# Patient Record
Sex: MALE | Race: ASIAN | NOT HISPANIC OR LATINO | URBAN - METROPOLITAN AREA
[De-identification: names, ages, dates, MRNs, and addresses within clinical notes are randomized per-mention and may not be internally consistent; named-entity substitution may affect disease eponyms.]

---

## 2018-07-27 ENCOUNTER — AMBULATORY - HEALTHEAST (OUTPATIENT)
Dept: ENDOCRINOLOGY | Facility: CLINIC | Age: 63
End: 2018-07-27

## 2018-07-27 DIAGNOSIS — E11.9 DIABETES MELLITUS, TYPE 2 (H): ICD-10-CM

## 2018-08-15 ENCOUNTER — COMMUNICATION - HEALTHEAST (OUTPATIENT)
Dept: ENDOCRINOLOGY | Facility: CLINIC | Age: 63
End: 2018-08-15

## 2018-08-27 ENCOUNTER — AMBULATORY - HEALTHEAST (OUTPATIENT)
Dept: LAB | Facility: CLINIC | Age: 63
End: 2018-08-27

## 2018-08-27 DIAGNOSIS — E11.9 DIABETES MELLITUS, TYPE 2 (H): ICD-10-CM

## 2018-08-27 LAB
ANION GAP SERPL CALCULATED.3IONS-SCNC: 7 MMOL/L (ref 5–18)
BUN SERPL-MCNC: 20 MG/DL (ref 8–22)
CALCIUM SERPL-MCNC: 10 MG/DL (ref 8.5–10.5)
CHLORIDE BLD-SCNC: 109 MMOL/L (ref 98–107)
CO2 SERPL-SCNC: 26 MMOL/L (ref 22–31)
CREAT SERPL-MCNC: 0.92 MG/DL (ref 0.7–1.3)
CREAT UR-MCNC: 94.2 MG/DL
GFR SERPL CREATININE-BSD FRML MDRD: >60 ML/MIN/1.73M2
GLUCOSE BLD-MCNC: 60 MG/DL (ref 70–125)
HBA1C MFR BLD: 9 % (ref 3.5–6)
LDLC SERPL CALC-MCNC: 142 MG/DL
MICROALBUMIN UR-MCNC: <0.5 MG/DL (ref 0–1.99)
MICROALBUMIN/CREAT UR: NORMAL MG/G
POTASSIUM BLD-SCNC: 4 MMOL/L (ref 3.5–5)
SODIUM SERPL-SCNC: 142 MMOL/L (ref 136–145)

## 2018-08-28 ENCOUNTER — RECORDS - HEALTHEAST (OUTPATIENT)
Dept: ADMINISTRATIVE | Facility: OTHER | Age: 63
End: 2018-08-28

## 2018-08-28 ENCOUNTER — OFFICE VISIT - HEALTHEAST (OUTPATIENT)
Dept: ENDOCRINOLOGY | Facility: CLINIC | Age: 63
End: 2018-08-28

## 2018-08-28 ENCOUNTER — AMBULATORY - HEALTHEAST (OUTPATIENT)
Dept: ENDOCRINOLOGY | Facility: CLINIC | Age: 63
End: 2018-08-28

## 2018-08-28 DIAGNOSIS — E11.9 DIABETES MELLITUS, TYPE 2 (H): ICD-10-CM

## 2018-11-28 ENCOUNTER — AMBULATORY - HEALTHEAST (OUTPATIENT)
Dept: LAB | Facility: CLINIC | Age: 63
End: 2018-11-28

## 2018-11-28 DIAGNOSIS — E11.9 DIABETES MELLITUS, TYPE 2 (H): ICD-10-CM

## 2018-11-28 LAB — HBA1C MFR BLD: 7.9 % (ref 3.5–6)

## 2018-11-29 ENCOUNTER — COMMUNICATION - HEALTHEAST (OUTPATIENT)
Dept: ADMINISTRATIVE | Facility: CLINIC | Age: 63
End: 2018-11-29

## 2018-12-04 ENCOUNTER — OFFICE VISIT - HEALTHEAST (OUTPATIENT)
Dept: ENDOCRINOLOGY | Facility: CLINIC | Age: 63
End: 2018-12-04

## 2018-12-04 DIAGNOSIS — Z79.4 TYPE 2 DIABETES MELLITUS WITHOUT COMPLICATION, WITH LONG-TERM CURRENT USE OF INSULIN (H): ICD-10-CM

## 2018-12-04 DIAGNOSIS — E11.9 TYPE 2 DIABETES MELLITUS WITHOUT COMPLICATION, WITH LONG-TERM CURRENT USE OF INSULIN (H): ICD-10-CM

## 2018-12-04 ASSESSMENT — MIFFLIN-ST. JEOR: SCORE: 1453.97

## 2018-12-25 ENCOUNTER — COMMUNICATION - HEALTHEAST (OUTPATIENT)
Dept: TELEHEALTH | Facility: CLINIC | Age: 63
End: 2018-12-25

## 2019-03-20 ENCOUNTER — AMBULATORY - HEALTHEAST (OUTPATIENT)
Dept: ENDOCRINOLOGY | Facility: CLINIC | Age: 64
End: 2019-03-20

## 2019-03-20 DIAGNOSIS — E11.9 TYPE 2 DIABETES MELLITUS WITHOUT COMPLICATION, WITHOUT LONG-TERM CURRENT USE OF INSULIN (H): ICD-10-CM

## 2019-06-04 ENCOUNTER — COMMUNICATION - HEALTHEAST (OUTPATIENT)
Dept: SCHEDULING | Facility: CLINIC | Age: 64
End: 2019-06-04

## 2019-06-06 ENCOUNTER — AMBULATORY - HEALTHEAST (OUTPATIENT)
Dept: LAB | Facility: CLINIC | Age: 64
End: 2019-06-06

## 2019-06-06 DIAGNOSIS — E11.9 TYPE 2 DIABETES MELLITUS WITHOUT COMPLICATION, WITHOUT LONG-TERM CURRENT USE OF INSULIN (H): ICD-10-CM

## 2019-06-06 LAB
ANION GAP SERPL CALCULATED.3IONS-SCNC: 6 MMOL/L (ref 5–18)
BUN SERPL-MCNC: 16 MG/DL (ref 8–22)
CALCIUM SERPL-MCNC: 9.9 MG/DL (ref 8.5–10.5)
CHLORIDE BLD-SCNC: 106 MMOL/L (ref 98–107)
CO2 SERPL-SCNC: 27 MMOL/L (ref 22–31)
CREAT SERPL-MCNC: 1.06 MG/DL (ref 0.7–1.3)
GFR SERPL CREATININE-BSD FRML MDRD: >60 ML/MIN/1.73M2
GLUCOSE BLD-MCNC: 146 MG/DL (ref 70–125)
HBA1C MFR BLD: 7.9 % (ref 3.5–6)
POTASSIUM BLD-SCNC: 4.4 MMOL/L (ref 3.5–5)
SODIUM SERPL-SCNC: 139 MMOL/L (ref 136–145)

## 2019-06-11 ENCOUNTER — OFFICE VISIT - HEALTHEAST (OUTPATIENT)
Dept: ENDOCRINOLOGY | Facility: CLINIC | Age: 64
End: 2019-06-11

## 2019-06-11 ENCOUNTER — AMBULATORY - HEALTHEAST (OUTPATIENT)
Dept: ENDOCRINOLOGY | Facility: CLINIC | Age: 64
End: 2019-06-11

## 2019-06-11 DIAGNOSIS — Z79.4 TYPE 2 DIABETES MELLITUS WITHOUT COMPLICATION, WITH LONG-TERM CURRENT USE OF INSULIN (H): ICD-10-CM

## 2019-06-11 DIAGNOSIS — E11.9 TYPE 2 DIABETES MELLITUS WITHOUT COMPLICATION, WITH LONG-TERM CURRENT USE OF INSULIN (H): ICD-10-CM

## 2019-06-11 RX ORDER — FLASH GLUCOSE SENSOR
1 KIT MISCELLANEOUS DAILY
Qty: 2 KIT | Refills: 5 | Status: SHIPPED | OUTPATIENT
Start: 2019-06-11

## 2019-06-11 RX ORDER — FLASH GLUCOSE SENSOR
1 KIT MISCELLANEOUS DAILY
Qty: 1 EACH | Refills: 1 | Status: SHIPPED | OUTPATIENT
Start: 2019-06-11

## 2019-06-11 ASSESSMENT — MIFFLIN-ST. JEOR: SCORE: 1450.34

## 2019-06-13 ENCOUNTER — COMMUNICATION - HEALTHEAST (OUTPATIENT)
Dept: ADMINISTRATIVE | Facility: CLINIC | Age: 64
End: 2019-06-13

## 2019-06-13 ENCOUNTER — COMMUNICATION - HEALTHEAST (OUTPATIENT)
Dept: ENDOCRINOLOGY | Facility: CLINIC | Age: 64
End: 2019-06-13

## 2019-07-24 ENCOUNTER — COMMUNICATION - HEALTHEAST (OUTPATIENT)
Dept: ADMINISTRATIVE | Facility: CLINIC | Age: 64
End: 2019-07-24

## 2019-07-24 DIAGNOSIS — E11.9 DIABETES MELLITUS WITHOUT COMPLICATION (H): ICD-10-CM

## 2019-08-12 ENCOUNTER — COMMUNICATION - HEALTHEAST (OUTPATIENT)
Dept: ENDOCRINOLOGY | Facility: CLINIC | Age: 64
End: 2019-08-12

## 2019-08-12 DIAGNOSIS — E11.9 DIABETES MELLITUS, TYPE 2 (H): ICD-10-CM

## 2019-08-12 RX ORDER — ROSUVASTATIN CALCIUM 40 MG/1
TABLET, COATED ORAL
Qty: 90 TABLET | Refills: 1 | Status: SHIPPED | OUTPATIENT
Start: 2019-08-12

## 2019-08-27 ENCOUNTER — COMMUNICATION - HEALTHEAST (OUTPATIENT)
Dept: ENDOCRINOLOGY | Facility: CLINIC | Age: 64
End: 2019-08-27

## 2019-08-27 DIAGNOSIS — E11.9 DIABETES MELLITUS, TYPE 2 (H): ICD-10-CM

## 2019-08-28 RX ORDER — GLIPIZIDE 10 MG/1
TABLET ORAL
Qty: 180 TABLET | Refills: 1 | Status: SHIPPED | OUTPATIENT
Start: 2019-08-28

## 2019-10-03 ENCOUNTER — COMMUNICATION - HEALTHEAST (OUTPATIENT)
Dept: ENDOCRINOLOGY | Facility: CLINIC | Age: 64
End: 2019-10-03

## 2019-10-03 DIAGNOSIS — E11.9 DIABETES MELLITUS, TYPE 2 (H): ICD-10-CM

## 2019-10-04 RX ORDER — INSULIN HUMAN 100 [IU]/ML
INJECTION, SUSPENSION SUBCUTANEOUS
Qty: 18 ML | Refills: 0 | Status: SHIPPED | OUTPATIENT
Start: 2019-10-04

## 2020-04-04 ENCOUNTER — COMMUNICATION - HEALTHEAST (OUTPATIENT)
Dept: ENDOCRINOLOGY | Facility: CLINIC | Age: 65
End: 2020-04-04

## 2020-04-04 DIAGNOSIS — E11.9 DIABETES MELLITUS, TYPE 2 (H): ICD-10-CM

## 2021-05-29 NOTE — TELEPHONE ENCOUNTER
Patient requested call back .  Who is calling:  Patient   Reason for Call:  Patient states that he lost his Lavonne reader . Patient requested to give a call .  Date of last appointment with primary care: 6/11/19  Okay to leave a detailed message: No

## 2021-05-29 NOTE — TELEPHONE ENCOUNTER
Call from pt       Just wanted to notify provider that he needed to re-schedule his lab test to another day         A/P:   > Will let them know       Charlie Renteria RN   Triage and Medication Refills

## 2021-05-29 NOTE — TELEPHONE ENCOUNTER
[facility] Team    In regards of: flash glucose scanning reader (Scrybe JEREMI 14 DAY READER) Misc    [facility]: [id number] [place], [place] [facility] [address]    Patient states that he went walking and he lost it. Will need another rx sent to the pharmacy above to get one. Notified that we can do so, but unsure if his insurance will pay for it. He states he understands and that if they dont he will go back to the old way of checking his sugars.

## 2021-05-29 NOTE — PROGRESS NOTES
"[facility]  ENDOCRINOLOGY    Diabetes Note 6/11/2019    Capo Torres, 1955, [id number]          Reason for visit      1. Type 2 diabetes mellitus without complication, with long-term current use of insulin (H)        HPI     Capo Torres is a very pleasant 63 y.o. old male who presents for follow up.  SUMMARY:  Capo returns today in f/u for DM 2. His current A1c is 7.9 and the same as his last.  He continues using 70/30 insulin.  He is taking 10 units in the morning and 10 units in the evening. He is also taking Glipizide and Metformin.   He notes that when he eats noodles, his BG go up. He brings both of his glucometers in today.  BG run between 51 and 334.  He is unsure why he had a 51.  He does note that his numbers have been better since Tax Season is over with. He reports that he is eating less rice and fewer noodles these days.       Past Medical History     Patient Active Problem List   Diagnosis     Vitamin D Deficiency     Diabetes mellitus without complication (H)     Mixed hyperlipidemia     Health maintenance examination     Insomnia, idiopathic     Nocturia        Family History       family history is not on file.    Social History      reports that he has never smoked. He has never used smokeless tobacco.      Review of Systems     Patient has no polyuria or polydipsia, no chest pain, dyspnea or TIA's, no numbness, tingling or pain in extremities  Remainder negative except as noted in HPI.    Vital Signs     BP (!) 82/60 (Patient Site: Right Arm, Patient Position: Sitting, Cuff Size: Adult Regular)   Pulse 64   Ht 5' 8\" (1.727 m)   Wt 151 lb 3.2 oz (68.6 kg)   BMI 22.99 kg/m    Wt Readings from Last 3 Encounters:   06/11/19 151 lb 3.2 oz (68.6 kg)   12/04/18 152 lb (68.9 kg)   08/28/18 148 lb 14.4 oz (67.5 kg)       Physical Exam     Constitutional:  Well developed, Well nourished  HENT:  Normocephalic,   Neck: Thyroid normal, No lymph nodes, Supple  Eyes:  PERRL, Conjunctiva pink  Respiratory:  " Normal breath sounds, No respiratory distress  Cardiovascular:  Normal heart rate, Normal rhythm, No murmurs  GI:  Bowel sounds normal, Soft, No tenderness  Musculoskeletal:  No gross deformity or lesions, normal dorsalis pedis pulses  Skin: No acanthosis nigricans, lipoatrophy or lipodystrophy  Neurologic:  Alert & oriented x 3, nonfocal  Psychiatric:  Affect, Mood, Insight appropriate  Diabetic foot exam: no ulcers, charcot's or high risk calluses, Normal monofilament exam        Assessment     1. Type 2 diabetes mellitus without complication, with long-term current use of insulin (H)        Plan     He will remain on his current medication regimen. Encouraged to eat high protein snack at HS instead of ice cream. He will work on this. F/u with me in 6 months. Time spent with pt today: 25 min with >50% spent in counseling and coordination of care.        Elin MATTA Endocrinology  6/11/2019  10:36 AM          Lab Results     Hemoglobin A1c   Date Value Ref Range Status   06/06/2019 7.9 (H) 3.5 - 6.0 % Final   11/28/2018 7.9 (H) 3.5 - 6.0 % Final   08/27/2018 9.0 (H) 3.5 - 6.0 % Final   04/23/2014 7.5 (H) 3.5 - 6.0 % Final   06/20/2013 8.5 (H) 4.5 - 6.2 % Final     Comment:     New test method with new reference range as of 6/4/12     Creatinine   Date Value Ref Range Status   06/06/2019 1.06 0.70 - 1.30 mg/dL Final   08/27/2018 0.92 0.70 - 1.30 mg/dL Final   04/23/2014 1.1 0.6 - 1.3 mg/dL Final     Microalbumin, Random Urine   Date Value Ref Range Status   08/27/2018 <0.50 0.00 - 1.99 mg/dL Final       Cholesterol   Date Value Ref Range Status   04/23/2014 135 <200 mg/dL Final     HDL Cholesterol   Date Value Ref Range Status   04/23/2014 48 >39 mg/dL Final     LDL Calculated   Date Value Ref Range Status   04/23/2014 69.8 <130.1 mg/dL Final     Triglycerides   Date Value Ref Range Status   04/23/2014 86 <151 mg/dL Final       Lab Results   Component Value Date    ALT 34 06/20/2013    AST 21 06/20/2013     ALKPHOS 87 06/20/2013    BILITOT 1.03 (H) 06/20/2013         Current Medications     Outpatient Medications Prior to Visit   Medication Sig Dispense Refill     b complex vitamins tablet Take 1 tablet by mouth daily.       aspirin 81 MG EC tablet Take 81 mg by mouth daily.       blood sugar diagnostic (ACCU-CHEK SMARTVIEW TEST STRIP) Strp Use 1 each As Directed 2 (two) times a day.        cholecalciferol, vitamin D3, (VITAMIN D3) 2,000 unit cap Take 2,000 Units by mouth daily.       FOLIC ACID/MULTIVITS-MIN/LUT (CENTRUM SILVER ORAL) Take 1 tablet by mouth daily.       glipiZIDE (GLUCOTROL) 10 MG tablet Take 1 tablet (10 mg total) by mouth 2 (two) times a day before meals. 180 tablet 3     insulin NPH and regular human (HUMULIN 70/30 U-100 KWIKPEN) 100 unit/mL (70-30) pen Take 10 units in the morning and 10 units in the evening 30 mL 3     metFORMIN (GLUCOPHAGE) 1000 MG tablet Take 1,000 mg by mouth 2 (two) times a day with meals.       rosuvastatin (CRESTOR) 40 MG tablet Take 1 tablet (40 mg total) by mouth at bedtime. 90 tablet 3     No facility-administered medications prior to visit.

## 2021-06-01 VITALS — WEIGHT: 148.9 LBS

## 2021-06-02 VITALS — WEIGHT: 151.2 LBS | HEIGHT: 68 IN | BODY MASS INDEX: 22.91 KG/M2

## 2021-06-02 VITALS — HEIGHT: 68 IN | BODY MASS INDEX: 23.04 KG/M2 | WEIGHT: 152 LBS

## 2021-06-16 PROBLEM — F51.01 INSOMNIA, IDIOPATHIC: Status: ACTIVE | Noted: 2017-07-19

## 2021-06-16 PROBLEM — R35.1 NOCTURIA: Status: ACTIVE | Noted: 2017-07-19

## 2021-06-16 PROBLEM — Z00.00 HEALTH MAINTENANCE EXAMINATION: Status: ACTIVE | Noted: 2019-06-11

## 2021-06-20 NOTE — PROGRESS NOTES
[facility]  ENDOCRINOLOGY    Diabetes Note 8/29/2018    Capo Torres, 1955, [id number]          Reason for visit      1. Diabetes mellitus, type 2 (H)        HPI     Capo Torres is a very pleasant 62 y.o. old male who presents for follow up.  SUMMARY:  Capo returns to clinic today in f/u for DM 2.  He was last seen here by Dr Metcalf in 2014.  Since then, he has been seen at [facility] by another Endocrinologist.  His current A1c is 9.  He states that this is higher than usual for him. He says that he was on vacation for a month. Unfortunately, this also included a vacation from good DM care. He states that there was no time to exercise or time even take his insulin as he should have done.  He says that he took his morning dose of 70/30, but not his evening dose.  He takes Glipizide 10 mg twice daily before meals.  He mentions that he has take the Glipizide and the 70/30 together and then had some hypoglycemia.  When asked if he had eaten, he stated that he had not.  He takes 10 units of the Humulin 70/30 two times a day and Metformin 500 mg.       Blood glucose data:  Ave: 224, SD: 84    Past Medical History     Patient Active Problem List   Diagnosis     Vitamin D Deficiency     Diabetes mellitus, type 2 (H)     Hyperlipidemia        Family History       family history is not on file.    Social History            Review of Systems     Patient has no polyuria or polydipsia, no chest pain, dyspnea or TIA's, no numbness, tingling or pain in extremities  Remainder negative except as noted in HPI.    Vital Signs     BP 90/64 (Patient Site: Right Arm, Patient Position: Sitting, Cuff Size: Adult Regular)  Pulse 76  Wt 148 lb 14.4 oz (67.5 kg)  Wt Readings from Last 3 Encounters:   08/28/18 148 lb 14.4 oz (67.5 kg)       Physical Exam     Constitutional:  Well developed, Well nourished  HENT:  Normocephalic,   Neck: Thyroid normal, No lymph nodes, Supple  Eyes:  PERRL, Conjunctiva pink  Respiratory:  Normal breath  sounds, No respiratory distress  Cardiovascular:  Normal heart rate, Normal rhythm, No murmurs  GI:  Bowel sounds normal, Soft, No tenderness  Musculoskeletal:  No gross deformity or lesions, normal dorsalis pedis pulses  Skin: No acanthosis nigricans, lipoatrophy or lipodystrophy  Neurologic:  Alert & oriented x 3, nonfocal  Psychiatric:  Affect, Mood, Insight appropriate  Diabetic foot exam: no ulcers, charcot's or high risk calluses, Normal monofilament exam        Assessment     1. Diabetes mellitus, type 2 (H)        Plan     Capo is going to work on taking ALL of his medication and return to exercising daily, and pretty sure that this will make things better.  He will f/u with me in 3 months. Time spent with pt today: 25 min with >50% spent in counseling and coordination of care.        Elin MATTA Endocrinology  8/29/2018  10:43 AM      Lab Results     Hemoglobin A1c   Date Value Ref Range Status   08/27/2018 9.0 (H) 3.5 - 6.0 % Final   04/23/2014 7.5 (H) 3.5 - 6.0 % Final   06/20/2013 8.5 (H) 4.5 - 6.2 % Final     Comment:     New test method with new reference range as of 6/4/12 01/11/2013 8.4 (H) 4.5 - 6.2 % Final     Comment:     New test method with new reference range as of 6/4/12 03/12/2012 7.8 (H) 4.8 - 6.0 % Final     Creatinine   Date Value Ref Range Status   08/27/2018 0.92 0.70 - 1.30 mg/dL Final   04/23/2014 1.1 0.6 - 1.3 mg/dL Final   06/20/2013 1.1 0.6 - 1.3 mg/dL Final     Microalbumin, Random Urine   Date Value Ref Range Status   08/27/2018 <0.50 0.00 - 1.99 mg/dL Final       Cholesterol   Date Value Ref Range Status   04/23/2014 135 <200 mg/dL Final     HDL Cholesterol   Date Value Ref Range Status   04/23/2014 48 >39 mg/dL Final     LDL Calculated   Date Value Ref Range Status   04/23/2014 69.8 <130.1 mg/dL Final     Triglycerides   Date Value Ref Range Status   04/23/2014 86 <151 mg/dL Final       Lab Results   Component Value Date    ALT 34 06/20/2013    AST 21 06/20/2013     ALKPHOS 87 06/20/2013    BILITOT 1.03 (H) 06/20/2013         Current Medications     Outpatient Medications Prior to Visit   Medication Sig Dispense Refill     aspirin 81 MG EC tablet Take 81 mg by mouth daily.       blood sugar diagnostic (ACCU-CHEK SMARTVIEW TEST STRIP) Strp Use 1 each As Directed 2 (two) times a day.        cholecalciferol, vitamin D3, (VITAMIN D3) 2,000 unit cap Take 2,000 Units by mouth daily.       FOLIC ACID/MULTIVITS-MIN/LUT (CENTRUM SILVER ORAL) Take 1 tablet by mouth daily.       glipiZIDE (GLUCOTROL) 10 MG tablet Take 10 mg by mouth 2 (two) times a day before meals.       metFORMIN (GLUCOPHAGE-XR) 750 MG 24 hr tablet TAKE TWO TABLETS BY MOUTH ONCE DAILY WITH FOOD 60 tablet 0     metFORMIN (GLUCOPHAGE-XR) 750 MG 24 hr tablet Take 2 tablets (1,500 mg total) by mouth daily with breakfast. 60 tablet 3     rosuvastatin (CRESTOR) 40 MG tablet Take 40 mg by mouth bedtime.       No facility-administered medications prior to visit.

## 2021-06-22 NOTE — PROGRESS NOTES
"[facility]  ENDOCRINOLOGY    Diabetes Note 12/6/2018    Capo Torres, 1955, [id number]          Reason for visit      1. Type 2 diabetes mellitus without complication, with long-term current use of insulin (H)        HPI     Capo Torres is a very pleasant 63 y.o. old male who presents for follow up.  SUMMARY:  Capo returns today in f/u for DM 2.  His current A1c is down to 7.9 from 9.  He reports that he has been running and playing [employer] to help with his blood sugars.  It appears to be working.  He states that sometimes he will get low in the middle of the night when he is has been exercising strenuously.  He is taking Humulin 70/30 mix at 10 units in the morning and 8-10 units in the evening.  He also takes Glipizide 10 mg twice daily, as well as 2 g of Metformin daily.  His glucometer download shows that he is testing about once a day.  He did have a low of 65 one morning, and states that he has made some adjustments to how he is eating to prevent this from happening.       Blood glucose data:  Ave: 173, SD: 84    Past Medical History     Patient Active Problem List   Diagnosis     Vitamin D Deficiency     Diabetes mellitus, type 2 (H)     Hyperlipidemia        Family History       family history is not on file.    Social History      reports that  has never smoked. he has never used smokeless tobacco.      Review of Systems     Patient has no polyuria or polydipsia, no chest pain, dyspnea or TIA's, no numbness, tingling or pain in extremities  Remainder negative except as noted in HPI.    Vital Signs     BP 94/72   Ht 5' 8\" (1.727 m)   Wt 152 lb (68.9 kg)   BMI 23.11 kg/m    Wt Readings from Last 3 Encounters:   12/04/18 152 lb (68.9 kg)   08/28/18 148 lb 14.4 oz (67.5 kg)       Physical Exam     Constitutional:  Well developed, Well nourished  HENT:  Normocephalic,   Neck: Thyroid normal, No lymph nodes, Supple  Eyes:  PERRL, Conjunctiva pink  Respiratory:  Normal breath sounds, No respiratory " distress  Cardiovascular:  Normal heart rate, Normal rhythm, No murmurs  GI:  Bowel sounds normal, Soft, No tenderness  Musculoskeletal:  No gross deformity or lesions, normal dorsalis pedis pulses  Skin: No acanthosis nigricans, lipoatrophy or lipodystrophy  Neurologic:  Alert & oriented x 3, nonfocal  Psychiatric:  Affect, Mood, Insight appropriate  Diabetic foot exam: no ulcers, charcot's or high risk calluses, Normal monofilament exam        Assessment     1. Type 2 diabetes mellitus without complication, with long-term current use of insulin (H)        Plan     Capo's DM management has improved.  He will continue with his current medication regimen.  Suggested that he eat a high protein snack before bed to help with overnight hypoglycemia. Refills provided.  He will f/u with me in 6 months. Time spent with pt today: 25 min with >50% spent in counseling and coordination of care.        Elin MATTA Endocrinology  12/6/2018  6:53 AM        Lab Results     Hemoglobin A1c   Date Value Ref Range Status   11/28/2018 7.9 (H) 3.5 - 6.0 % Final   08/27/2018 9.0 (H) 3.5 - 6.0 % Final   04/23/2014 7.5 (H) 3.5 - 6.0 % Final   06/20/2013 8.5 (H) 4.5 - 6.2 % Final     Comment:     New test method with new reference range as of 6/4/12 01/11/2013 8.4 (H) 4.5 - 6.2 % Final     Comment:     New test method with new reference range as of 6/4/12     Creatinine   Date Value Ref Range Status   08/27/2018 0.92 0.70 - 1.30 mg/dL Final   04/23/2014 1.1 0.6 - 1.3 mg/dL Final   06/20/2013 1.1 0.6 - 1.3 mg/dL Final     Microalbumin, Random Urine   Date Value Ref Range Status   08/27/2018 <0.50 0.00 - 1.99 mg/dL Final       Cholesterol   Date Value Ref Range Status   04/23/2014 135 <200 mg/dL Final     HDL Cholesterol   Date Value Ref Range Status   04/23/2014 48 >39 mg/dL Final     LDL Calculated   Date Value Ref Range Status   04/23/2014 69.8 <130.1 mg/dL Final     Triglycerides   Date Value Ref Range Status   04/23/2014 86 <151  mg/dL Final       Lab Results   Component Value Date    ALT 34 06/20/2013    AST 21 06/20/2013    ALKPHOS 87 06/20/2013    BILITOT 1.03 (H) 06/20/2013         Current Medications     Outpatient Medications Prior to Visit   Medication Sig Dispense Refill     aspirin 81 MG EC tablet Take 81 mg by mouth daily.       blood sugar diagnostic (ACCU-CHEK SMARTVIEW TEST STRIP) Strp Use 1 each As Directed 2 (two) times a day.        cholecalciferol, vitamin D3, (VITAMIN D3) 2,000 unit cap Take 2,000 Units by mouth daily.       glipiZIDE (GLUCOTROL) 10 MG tablet Take 1 tablet (10 mg total) by mouth 2 (two) times a day before meals. 180 tablet 3     insulin NPH and regular human (HUMULIN 70/30 U-100 KWIKPEN) 100 unit/mL (70-30) pen Take 10 units in the morning and 10 units in the evening 30 mL 3     metFORMIN (GLUCOPHAGE) 1000 MG tablet Take 1,000 mg by mouth 2 (two) times a day with meals.       rosuvastatin (CRESTOR) 40 MG tablet Take 1 tablet (40 mg total) by mouth at bedtime. 90 tablet 3     metFORMIN (GLUMETZA) 500 MG (MOD) 24 hr tablet Take 1,000 mg by mouth daily with breakfast Take 4 tabs daily .             FOLIC ACID/MULTIVITS-MIN/LUT (CENTRUM SILVER ORAL) Take 1 tablet by mouth daily.       No facility-administered medications prior to visit.

## 2021-08-21 ENCOUNTER — HEALTH MAINTENANCE LETTER (OUTPATIENT)
Age: 66
End: 2021-08-21

## 2021-10-16 ENCOUNTER — HEALTH MAINTENANCE LETTER (OUTPATIENT)
Age: 66
End: 2021-10-16

## 2021-12-11 ENCOUNTER — HEALTH MAINTENANCE LETTER (OUTPATIENT)
Age: 66
End: 2021-12-11

## 2022-02-05 ENCOUNTER — HEALTH MAINTENANCE LETTER (OUTPATIENT)
Age: 67
End: 2022-02-05

## 2022-04-02 ENCOUNTER — HEALTH MAINTENANCE LETTER (OUTPATIENT)
Age: 67
End: 2022-04-02

## 2022-10-01 ENCOUNTER — HEALTH MAINTENANCE LETTER (OUTPATIENT)
Age: 67
End: 2022-10-01

## 2023-02-05 ENCOUNTER — HEALTH MAINTENANCE LETTER (OUTPATIENT)
Age: 68
End: 2023-02-05

## 2023-05-14 ENCOUNTER — HEALTH MAINTENANCE LETTER (OUTPATIENT)
Age: 68
End: 2023-05-14

## 2023-08-06 ENCOUNTER — HEALTH MAINTENANCE LETTER (OUTPATIENT)
Age: 68
End: 2023-08-06

## 2024-03-03 ENCOUNTER — HEALTH MAINTENANCE LETTER (OUTPATIENT)
Age: 69
End: 2024-03-03